# Patient Record
Sex: MALE | Race: WHITE | NOT HISPANIC OR LATINO
[De-identification: names, ages, dates, MRNs, and addresses within clinical notes are randomized per-mention and may not be internally consistent; named-entity substitution may affect disease eponyms.]

---
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
## 2019-12-16 ENCOUNTER — APPOINTMENT (OUTPATIENT)
Dept: ORTHOPEDIC SURGERY | Facility: CLINIC | Age: 19
End: 2019-12-16
Payer: COMMERCIAL

## 2019-12-16 VITALS
DIASTOLIC BLOOD PRESSURE: 76 MMHG | BODY MASS INDEX: 18.51 KG/M2 | SYSTOLIC BLOOD PRESSURE: 125 MMHG | HEART RATE: 59 BPM | TEMPERATURE: 98.2 F | HEIGHT: 69 IN | WEIGHT: 125 LBS

## 2019-12-16 DIAGNOSIS — M77.8 OTHER ENTHESOPATHIES, NOT ELSEWHERE CLASSIFIED: ICD-10-CM

## 2019-12-16 DIAGNOSIS — Z78.9 OTHER SPECIFIED HEALTH STATUS: ICD-10-CM

## 2019-12-16 PROCEDURE — 73100 X-RAY EXAM OF WRIST: CPT | Mod: RT

## 2019-12-16 PROCEDURE — 99213 OFFICE O/P EST LOW 20 MIN: CPT

## 2020-01-02 ENCOUNTER — APPOINTMENT (OUTPATIENT)
Dept: ORTHOPEDIC SURGERY | Facility: CLINIC | Age: 20
End: 2020-01-02

## 2020-01-02 NOTE — PHYSICAL EXAM
[Normal] : Gait: normal [de-identified] : Left Wrist: \par Range of Motion in Degrees:\par 	                                                Claimant:	                Normal:	\par Dorsiflexion: (Active)               	90-degrees	90-degrees	\par Dorsiflexion: (Passive)	                90-degrees	90-degrees	\par Palmar flexion: (Active)              	90-degrees	90-degrees	\par Palmar flexion: (Passive)              	90-degrees	90-degrees	\par Radial & ulnar deviation(Active)	30-degrees	30-degrees	\par Radial & ulnar deviation(Passive)	30-degrees	30-degrees	\par Pronation/Supination:(Active)    	0-180 degrees	0-180 degrees	\par Pronation/Supination:(Passive)          	0-180 degrees	0-180 degrees	\par \par No instability.  No volar, radial or ulnar tenderness.  No dorsal, radial or ulnar tenderness.  Negative Tinel’s.  Negative Phalen’s.   No tenderness at the TFCC.  No tenderness with ulnar deviation.  No tenderness of the first dorsal compartment.  Negative Finkelstein’s.  No tenderness at the level of the basal joint.  Negative grind.  No muscular atrophy.  No tenderness with flexion and extension of the wrist.  No motor or sensory deficits.  2+ radial and ulnar pulses.  Skin is intact.  No rashes, scars or lesions.  \par \par Right Wrist:\par Range of Motion in Degrees:\par 	                                                Claimant:	                Normal:	\par Dorsiflexion: (Active)               	90-degrees	90-degrees	\par Dorsiflexion: (Passive)	                90-degrees	90-degrees	\par Palmar flexion: (Active)              	90-degrees	90-degrees	\par Palmar flexion: (Passive)              	90-degrees	90-degrees	\par Radial & ulnar deviation(Active)	30-degrees	30-degrees	\par Radial & ulnar deviation(Passive)	30-degrees	30-degrees	\par Pronation/Supination:(Active)    	0-180 degrees	0-180 degrees	\par Pronation/Supination:(Passive)          	0-180 degrees	0-180 degrees	\par \par No instability.  Tenderness over the volar radial aspect. Tenderness over the ECU. Tenderness with flexion and extension of the wrist.  No ulnar radial tenderness.  No dorsal ulnar tenderness.  No tenderness at the TFCC.  No tenderness with ulnar deviation.  No tenderness of the first dorsal compartment. Negative Tinel’s.  Negative Phalen’s.    Negative Finkelstein’s.  No tenderness at the level of the basal joint.  Negative grind.  No muscular atrophy.  No motor or sensory deficits.  2+ radial and ulnar pulses.  Skin is intact.  No rashes, scars or lesions. \par  [de-identified] : . [de-identified] : Appearance:  Well-developed, well-nourished male in no acute distress.\par \par \par  [de-identified] : Radiographs, two views of the right wrist, show no obvious osseous abnormality.

## 2020-01-02 NOTE — ADDENDUM
[FreeTextEntry1] : This note was written by Mouna Gallagher on 12/24/2019 acting as a scribe for THAI FONSECA III, MD

## 2020-01-02 NOTE — DISCUSSION/SUMMARY
[de-identified] : At this time, due to ECU tendinitis of right wrist, I recommended rest, ice and anti-inflammatory.  He will be reassessed in three to four weeks.

## 2020-01-02 NOTE — HISTORY OF PRESENT ILLNESS
[de-identified] : The patient comes in today. He states he has been wrestling and he developed some pain over the dorsum of his wrist.  He wrestled for about a week.  It is mostly down the right wrist that bothers him.  The patient states the onset/injury occurred on 11/19/19.  This injury is not work related.  The patient states the pain is localized.  The patient describes the pain as stabbing.  The patient notes rest makes his symptoms better, while pressure makes his symptoms worse.

## 2020-11-14 ENCOUNTER — TRANSCRIPTION ENCOUNTER (OUTPATIENT)
Age: 20
End: 2020-11-14

## 2021-05-27 ENCOUNTER — NON-APPOINTMENT (OUTPATIENT)
Age: 21
End: 2021-05-27

## 2021-05-27 ENCOUNTER — APPOINTMENT (OUTPATIENT)
Dept: ORTHOPEDIC SURGERY | Facility: CLINIC | Age: 21
End: 2021-05-27
Payer: COMMERCIAL

## 2021-05-27 VITALS
HEART RATE: 99 BPM | HEIGHT: 69 IN | WEIGHT: 150 LBS | SYSTOLIC BLOOD PRESSURE: 118 MMHG | BODY MASS INDEX: 22.22 KG/M2 | DIASTOLIC BLOOD PRESSURE: 78 MMHG

## 2021-05-27 DIAGNOSIS — M65.9 SYNOVITIS AND TENOSYNOVITIS, UNSPECIFIED: ICD-10-CM

## 2021-05-27 PROCEDURE — 73560 X-RAY EXAM OF KNEE 1 OR 2: CPT | Mod: RT

## 2021-05-27 PROCEDURE — 99213 OFFICE O/P EST LOW 20 MIN: CPT

## 2021-06-03 NOTE — DISCUSSION/SUMMARY
[de-identified] : At this time, due to synovitis of the right knee, I recommend ice and anti-inflammatory.  He will be reassessed in three to four weeks.

## 2021-06-03 NOTE — ADDENDUM
[FreeTextEntry1] : This note was written by Mouna Gallagher on 06/03/2021 acting as a scribe for THAI FONSECA III, MD

## 2021-06-03 NOTE — HISTORY OF PRESENT ILLNESS
[de-identified] : The patient comes in today with complaints referable to his right knee.  He had a meniscal tear in the past. He states it swelled up on him, he has been icing it and it has gotten better, but he wanted it checked out.

## 2021-06-03 NOTE — PHYSICAL EXAM
[de-identified] : Left Knee: \par Range of Motion in Degrees	\par 	                  Claimant:	Normal:	\par Flexion Active	  135 	                135-degrees	\par Flexion Passive	  135	                135-degrees	\par Extension Active	  0-5	                0-5-degrees	\par Extension Passive	  0-5	                0-5-degrees	\par \par No weakness to flexion/extension.  No evidence of instability in the AP plane or varus or valgus stress.  Negative  Lachman.  Negative pivot shift.  Negative anterior drawer test.  Negative posterior drawer test.  Negative [place].  Negative Apley grind.  No medial or lateral joint line tenderness.  No tenderness over the medial and lateral facet of the patella.  No patellofemoral crepitations.  No lateral tilting patella.  No patellar apprehension.  No crepitation in the medial and lateral femoral condyle.  No proximal or distal swelling, edema or tenderness.  No gross motor or sensory deficits.  No intra-articular swelling.  2+ DP and PT pulses. No varus or valgus malalignment.  Skin is intact.  No rashes, scars or lesions.  \par  \par Right Knee: \par Range of Motion in Degrees	\par 	                  Claimant:	Normal:	\par Flexion Active	  135 	                135-degrees	\par Flexion Passive	  135	                135-degrees	\par Extension Active	  0-5	                0-5-degrees	\par Extension Passive	  0-5	                0-5-degrees	\par \par No weakness to flexion/extension.  No evidence of instability in the AP plane or varus or valgus stress.  Negative  Lachman.  Negative pivot shift.  Negative anterior drawer test.  Negative posterior drawer test.  Negative [place].  Negative Apley grind.  No medial or lateral joint line tenderness.  No tenderness over the medial and lateral facet of the patella.  No patellofemoral crepitations.  No lateral tilting patella.  No patellar apprehension.  No crepitation in the medial and lateral femoral condyle.  No proximal or distal swelling, edema or tenderness.  No gross motor or sensory deficits.  Minimal effusion.  2+ DP and PT pulses. No varus or valgus malalignment.  Skin is intact.  No rashes, scars or lesions.  \par   [de-identified] : Ambulating with a slightly antalgic to antalgic gait.  Station:  Normal.  [de-identified] : Appearance:  Well-developed, well-nourished male in no acute distress.\par   [de-identified] : Radiographs, two views of the right knee, are essentially normal.

## 2022-08-11 ENCOUNTER — APPOINTMENT (OUTPATIENT)
Dept: INTERNAL MEDICINE | Facility: CLINIC | Age: 22
End: 2022-08-11

## 2025-09-04 ENCOUNTER — NON-APPOINTMENT (OUTPATIENT)
Age: 25
End: 2025-09-04

## 2025-09-04 ENCOUNTER — APPOINTMENT (OUTPATIENT)
Dept: ORTHOPEDIC SURGERY | Facility: CLINIC | Age: 25
End: 2025-09-04
Payer: COMMERCIAL

## 2025-09-04 DIAGNOSIS — S42.291A OTHER DISPLACED FRACTURE OF UPPER END OF RIGHT HUMERUS, INITIAL ENCOUNTER FOR CLOSED FRACTURE: ICD-10-CM

## 2025-09-04 PROBLEM — S43.431D TEAR OF RIGHT GLENOID LABRUM, SUBSEQUENT ENCOUNTER: Status: ACTIVE | Noted: 2025-09-04

## 2025-09-04 PROCEDURE — 99204 OFFICE O/P NEW MOD 45 MIN: CPT

## 2025-09-04 PROCEDURE — 73030 X-RAY EXAM OF SHOULDER: CPT | Mod: RT

## 2025-09-11 DIAGNOSIS — S43.431D SUPERIOR GLENOID LABRUM LESION OF RIGHT SHOULDER, SUBSEQUENT ENCOUNTER: ICD-10-CM

## 2025-09-11 RX ORDER — OXYCODONE 5 MG/1
5 TABLET ORAL
Qty: 15 | Refills: 0 | Status: ACTIVE | COMMUNITY
Start: 2025-09-11 | End: 1900-01-01

## 2025-09-11 RX ORDER — ONDANSETRON 4 MG/1
4 TABLET ORAL
Qty: 42 | Refills: 0 | Status: COMPLETED | COMMUNITY
Start: 2025-09-11 | End: 2025-09-18

## 2025-09-12 ENCOUNTER — APPOINTMENT (OUTPATIENT)
Dept: ORTHOPEDIC SURGERY | Facility: AMBULATORY SURGERY CENTER | Age: 25
End: 2025-09-12